# Patient Record
Sex: MALE | Race: WHITE | URBAN - METROPOLITAN AREA
[De-identification: names, ages, dates, MRNs, and addresses within clinical notes are randomized per-mention and may not be internally consistent; named-entity substitution may affect disease eponyms.]

---

## 2017-04-05 ENCOUNTER — HISTORICAL (OUTPATIENT)
Dept: ADMINISTRATIVE | Facility: HOSPITAL | Age: 82
End: 2017-04-05

## 2018-07-28 ENCOUNTER — HISTORICAL (OUTPATIENT)
Dept: ADMINISTRATIVE | Facility: HOSPITAL | Age: 83
End: 2018-07-28

## 2018-07-29 ENCOUNTER — HISTORICAL (OUTPATIENT)
Dept: ADMINISTRATIVE | Facility: HOSPITAL | Age: 83
End: 2018-07-29

## 2018-07-31 ENCOUNTER — HISTORICAL (OUTPATIENT)
Dept: ADMINISTRATIVE | Facility: HOSPITAL | Age: 83
End: 2018-07-31

## 2022-09-13 NOTE — H&P
Patient:   Min Luo             MRN: [id number]            FIN: [id number]               Age:   88 years     Sex:  Male     :  1929   Associated Diagnoses:   None   Author:   Jaswinder Hogue MD      Chief Complaint   2018 9:58 CDT       n/v/d and weakness started yesterday, last vomited 1500 yesterday, no diarrhea today, today weakness        History of Present Illness   CC: weakness  pt with hx of two weeks of decline. having chills and subjective fever.  developed nausea and sob over this period with poor appetite. noted also cough but non productive.  he developed vimiting and diarrhea yesterday but none since that time. He appears very weak and frail; He has had confusion over the last two weeks which has been worsening; He has a renal mass on CT that possibly could be a malignancy. He has risk factors of DVT and age. sympotms described as moderate and worsening; nothing relieving symptoms.       Review of Systems   Constitutional:  Fever, Chills, Sweats, Weakness, Decreased activity.    Eye:  No double vision, No visual disturbances.    Ear/Nose/Mouth/Throat:  No nasal congestion, No sore throat.    Respiratory:  Shortness of breath, Cough, Sputum production, Wheezing.    Cardiovascular:  No chest pain, No syncope.    Gastrointestinal:  Nausea, Vomiting, Diarrhea, No constipation, No heartburn, No abdominal pain.    Genitourinary:  No dysuria, No hematuria.    Hematology/Lymphatics:  No bruising tendency, No bleeding tendency.    Endocrine:  No excessive thirst, No polyuria.    Immunologic:  Not immunocompromised, No recurrent fevers.    Musculoskeletal:  Joint pain.    Integumentary:  No rash, No pruritus.    Neurologic:  Confusion, Headache.    Psychiatric:  No anxiety, No depression.    All other systems are negative      Health Status   Current medications:  (Selected)   Inpatient Medications  Ordered  Norco 5 mg-325 mg oral tablet: 1 tab(s), form: Tab, Oral, BID, first dose 18  21:00:00 CDT  Normal Saline (0.9% NS) IV 1,000 mL: 1,000 mL, 1,000 mL, IV, 100 mL/hr, start date 07/28/18 15:26:00 CDT  Rocephin (for IVPB): 1,000 mg, form: Infusion, IV Piggyback, q24hr, Infuse over: 30 minute(s), first dose 07/29/18 9:00:00 CDT  Xopenex 1.25 mg/3 mL inhalation solution: 1.25 mg, form: Soln-Inh, NEB, q6hr Resp, first dose 07/28/18 14:00:00 CDT  Zithromax 500 mg/D5W 250 mL IVPB: 500 mg, form: Injection, IV Piggyback, q24hr, Infuse over: 1 hr, first dose 07/29/18 9:27:00 CDT  multivitamin with minerals (Senior Tab): 1 tab(s), form: Tab, Oral, Daily, first dose 07/28/18 13:59:00 CDT  omeprazole 20 mg oral EC tablet (pt. own): 20 mg, form: Cap, Oral, Daily, first dose 07/28/18 15:30:00 CDT  ondansetron: 8 mg, form: Injection, IV Slow, q4hr PRN for nausea, first dose 07/28/18 19:20:00 CDT  warfarin 1 mg oral tablet: 2 mg, form: Tab, Oral, Tu, first dose 07/31/18 17:00:00 CDT, 1mg tab x 2 tabs on tuesday  warfarin: 1 mg, form: Tab, Oral, Jose,M,W,Th,F,Sa, first dose 07/28/18 17:00:00 CDT, 1 mg tab mon wed thur fri sat and sun  Prescriptions  Prescribed  Nasonex 50 mcg/inh nasal spray: 100 mcg = 2 spray(s), Nasal, Daily, # 3 EA, 3 Refill(s), [facility]: [facility]  [place] 5 mg-325 mg oral tablet: 1 tab(s), Oral, BID, # 60 tab(s), 0 Refill(s), Pharmacy: [facility]  mupirocin 2% topical oint: 1 rianna, TOP, TID, # 30 gm, 1 Refill(s), Pharmacy: [facility]  omeprazole 20 mg oral DR capsule: See Instructions, TAKE 1 CAPSULE DAILY, # 90 cap(s), 1 Refill(s), [facility]: [facility]  scooter: scooter, See Instructions, Impaired mobility due to osteoarthritis knee M17.9, # 1 EA, 0 Refill(s)  warfarin 1 mg oral tablet: See Instructions, TAKE 2 TABLETS ON SUNDAY, MONDAY, WEDNESDAY, THURSDAY AND FRIDAY NIGHT, THEN TAKE ONE AND ONE-HALF TABLETS ON TUESDAY AND SATURDAY, # 180 tab(s), eRx: [facility]  Documented  Medications  Documented  multivitamin with minerals (Senior Tab): 1 tab(s), Oral, Daily, daily, 0 Refill(s), Type: Maintenance      Histories   Past Medical History:    Resolved  Tubular adenoma ([id number]):  Resolved.  Peyronie's disease ([id number]):  Resolved.  Osteoarthritis of right knee joint (3545915068):  Resolved.  DVT - Deep vein thrombosis (9250687115):  Resolved.  Diverticulosis ([id number]):  Resolved.   Family History:    Primary malignant neoplasm of prostate  Brother  Atrial Fibrillation and Flutter  Brother  CVA (cerebral infarction).  Brother     Procedure history:    Inguinal herniorrhaphy (SNOMED CT 84947201).  Comments:  12/3/2015 12:32 - Janice Duggan MD  incarcerated  Colonic polypectomy (SNOMED CT 824259555).  Nasal polypectomy (SNOMED CT 857765485).  Partial colectomy (SNOMED CT 517392823).  IVC - Insertion of inferior vena caval filter (SNOMED CT 2748663759).   Social History        Social & Psychosocial Habits    Alcohol  09/07/2014 Risk Assessment: Denies Alcohol Use    06/04/2015  Use: Current    Type: Beer    Frequency: 1-2 times per month    Employment/School  06/04/2015  Status: Retired    Home/Environment  06/04/2015  Lives with: Spouse    Comment: Wife, Bell - 06/04/2015 08:54 - Janice Duggan MD    Tobacco  09/07/2014 Risk Assessment: Denies Tobacco Use    06/04/2015  Use: Never smoker  .        Physical Examination   General:  Alert and oriented, No acute distress.    Eye:  Pupils are equal, round and reactive to light, Extraocular movements are intact.    HENT:  Normocephalic.    Neck:  Supple, Non-tender, No carotid bruit.    Respiratory:  Breath sounds are equal, Symmetrical chest wall expansion, bilateral ronchi and wheezing; rales bilaterally worse on left.    Cardiovascular:  Normal rate, Regular rhythm, RSM sharp murmur noted diastolic.    Gastrointestinal:  Soft, Non-tender,  Non-distended.       Vital Signs (last 24 hrs)_____  Last Charted___________  Temp Oral     H 38.1DegC  (JUL 29 07:31)  Heart Rate Peripheral   98 bpm  (JUL 29 09:00)  Resp Rate         20 br/min  (JUL 29 09:00)  SBP      137 mmHg  (JUL 29 07:31)  DBP      62 mmHg  (JUL 29 07:31)  SpO2      95 %  (JUL 29 09:00)  Weight      66.5 kg  (JUL 29 05:00)     Genitourinary:  No costovertebral angle tenderness, No scrotal tenderness.    Lymphatics:  No lymphadenopathy neck, axilla, groin.    Musculoskeletal:  Normal range of motion, No tenderness, No swelling, right knee show no redness, warmth or swelling.    Integumentary:  Warm, Dry.    Neurologic:  Not alert, Not oriented.    Cognition and Speech:  appears confused.    Psychiatric:  Cooperative.       Review / Management   Results review:     Labs (Last four charted values)  WBC                  H 11.7 (JUL 29)   Hgb                  L 11.5 (JUL 29)   Hct                  L 34.4 (JUL 29)   Plt                  L 119 (JUL 29) .    Laboratory Results   Last 5 Days Lab Results : PowerNote Discrete Results   7/29/2018 5:23 CDT       WBC                       11.7 x10(3)/mcL  HI                             RBC                       3.50 x10(6)/mcL  LOW                             Hgb                       11.5 gm/dL  LOW                             Hct                       34.4 %  LOW                             Platelet                  119 x10(3)/mcL  LOW                             MCV                       98.5 fL  HI                             MCH                       32.8 pg  HI                             MCHC                      33.3 gm/dL                             RDW                       14.3 %                             MPV                       9.4 fL                             Abs Neut                  9.50 x10(3)/mcL  HI                             Neutro Auto               81 %  HI                             Lymph Auto                7 %  LOW                              Mono Auto                 12 %  HI                             Eos Auto                  0 %  NA                             Basophil Auto             0 %                             Abs Neutro                9.50 x10(3)/mcL  HI                             Abs Lymph                 0.8 x10(3)/mcL  NA                             Abs Mono                  1.4 x10(3)/mcL  NA    7/28/2018 13:03 CDT      POC Troponin              0.08 ng/mL    7/28/2018 11:00 CDT      PT                        23.4 second(s)  HI                             INR                       2.40                             PTT                       33.8 second(s)    7/28/2018 10:55 CDT      WBC                       9.2 x10(3)/mcL                             RBC                       4.06 x10(6)/mcL  LOW                             Hgb                       13.1 gm/dL  LOW                             Hct                       40.0 %  LOW                             Platelet                  147 x10(3)/mcL                             MCV                       98.4 fL  HI                             MCH                       32.2 pg  HI                             MCHC                      32.7 gm/dL  LOW                             RDW                       14.0 %                             MPV                       9.2 fL                             Abs Neut                  8.10 x10(3)/mcL                             Neutro Auto               88 %  HI                             Lymph Auto                4 %  LOW                             Mono Auto                 7 %                             Basophil Auto             0 %                             Abs Neutro                8.10 x10(3)/mcL                             Abs Lymph                 0.4 x10(3)/mcL  NA                             Abs Mono                  0.6 x10(3)/mcL  NA                             Sodium Lvl                135 mmol/L  LOW                              Potassium Lvl             3.5 mmol/L                             Chloride                  99 mmol/L                             CO2                       26.3 mmol/L                             Calcium Lvl               8.6 mg/dL                             Glucose Lvl               138 mg/dL  HI                             BUN                       19.9 mg/dL  HI                             Creatinine                1.39 mg/dL  HI                             eGFR-AA                   >60 mL/min/1.73 m2  NA                             eGFR-ANTONI                  51 mL/min/1.73 m2  NA                             Bili Total                0.8 mg/dL                             Bili Direct               0.20 mg/dL                             Bili Indirect             0.60 mg/dL                             AST                       34 unit/L                             ALT                       23 unit/L                             Alk Phos                  62 unit/L                             Total Protein             7.8 gm/dL                             Albumin Lvl               3.20 gm/dL  LOW                             Globulin                  4.60 gm/dL  HI                             A/G Ratio                 0.7 ratio  LOW    7/28/2018 10:54 CDT      POC Troponin              0.05 ng/mL    7/28/2018 10:33 CDT      UA Appear                 Clear                             UA Color                  Dark yellow                             UA Spec Grav              1.025                             UA Bili                   Negative                             UA pH                     6.5                             UA Urobilinogen           0.2                             UA Blood                  Moderate                             UA Glucose                Negative                             UA Ketones                15                             UA Protein                100                              UA Nitrite                Negative                             UA Leuk Est               Negative                             UA WBC                    None Seen                             UA RBC                    0-5 /HPF                             UA Bacteria               Rare                             UA Squam Epithelial       Rare        * Final Report *    Reason For Exam  Fever  no source;Other (please specify)    Radiology Report  Clinical History:  Fever     Technique:  Axial images of the chest, abdomen, and pelvis were obtained With  Contrast. Sagittal and coronal reconstructed images were available for  review.     Automatic exposure control was utilized to reduce the patient's  radiation dose.     Comparison  No prior images available for comparison.     Findings  AORTA: The thoracoabdominal aorta is normal in course and caliber.  Scattered atherosclerotic disease is noted.     HEART: Normal size. No pericardial effusion. Coronary atherosclerotic  disease is noted.     THYROID GLAND: The thyroid is not enlarged. There are no nodules  identified.     AIRWAYS: Trachea is midline and tracheobronchial tree is patent.     LUNGS: There are no masses or nodules identified. No pleural effusion  or pneumothorax. Bilateral lower lobe subsegmental atelectatic  changes.     THROACIC LYMPH NODES: There is no significant mediastinal, axillary or  hilar lymphadenopathy.     HEPATOBILIARY: No focal hepatic lesion is identified, status post  cholecystectomy.     SPLEEN: Normal     PANCREAS: No focal masses or ductal dilatation.     ADRENALS: No adrenal nodules.     KIDNEYS: The right kidney demonstrates no stone, hydronephrosis, or  hydroureter. No focal mass identified.The left kidney demonstrates no  stone, hydronephrosis, or hydroureter. 3.2 cm left upper pole soft  tissue appearing mass is noted.     ABDOMINAL LYMPHADENOPATHY/RETROPERITONEUM: There is no retroperitoneal  lymphadenopathy.     BOWEL:  Limited evaluation secondary to motion throughout. No acute  abnormality appreciated.     PELVIC VISCERA: Normal. No pelvic mass.     PELVIC LYMPH NODES: No lymphadenopathy.     PERITONEUM/ BODY WALL: No ascites or implant.     SKELETAL: No displaced fracture identified. Severe degenerative  changes of the lumbar spine with neural foraminal narrowing.     Impression  Study is limited significantly with motion artifact throughout.     No acute abnormality. Bilateral lower lobe subsegmental atelectatic  changes.     3.2 cm mass within the left upper pole of the left kidney. Malignancy  is not excluded. Further evaluation may be obtained with renal MRI.       Signature Line  Electronically Signed By: Aristides Langley MD  Date/Time Signed: 07/28/2018 13:20    Technical Comments  CT Abdomen and Pelvis W Contrast:  Did Patient have reaction? No   Consent Signed? Yes   Home Medication Reviewed? Yes     CT Angio Chest PE W Contrast:  Did Patient have reaction? No   Consent Signed? Yes   Home Medication Reviewed? Yes        This document has an image    Result type: CT Abdomen and Pelvis W Contrast  Result date: July 28, 2018 12:59 CDT  Result status: Auth (Verified)  Result title: CT Abdomen and Pelvis W Contrast  Performed by: Aristides Langley MD on July 28, 2018 13:02 CDT  Verified by: Aristides Langley MD on July 28, 2018 13:20 CDT  Encounter info: [id number], [facility] Acute, Emergency, 7/28/2018 - 7/28/2018     * Final Report *    Reason For Exam  hypoxemia h/o dvt;Other (please specify)    Radiology Report  Clinical History:  Fever     Technique:  Axial images of the chest, abdomen, and pelvis were obtained With  Contrast. Sagittal and coronal reconstructed images were available for  review.     Automatic exposure control was utilized to reduce the patient's  radiation dose.     Comparison  No prior images available for comparison.     Findings  AORTA: The thoracoabdominal aorta is normal in course and  caliber.  Scattered atherosclerotic disease is noted.     HEART: Normal size. No pericardial effusion. Coronary atherosclerotic  disease is noted.     THYROID GLAND: The thyroid is not enlarged. There are no nodules  identified.     AIRWAYS: Trachea is midline and tracheobronchial tree is patent.     LUNGS: There are no masses or nodules identified. No pleural effusion  or pneumothorax. Bilateral lower lobe subsegmental atelectatic  changes.     THROACIC LYMPH NODES: There is no significant mediastinal, axillary or  hilar lymphadenopathy.     HEPATOBILIARY: No focal hepatic lesion is identified, status post  cholecystectomy.     SPLEEN: Normal     PANCREAS: No focal masses or ductal dilatation.     ADRENALS: No adrenal nodules.     KIDNEYS: The right kidney demonstrates no stone, hydronephrosis, or  hydroureter. No focal mass identified.The left kidney demonstrates no  stone, hydronephrosis, or hydroureter. 3.2 cm left upper pole soft  tissue appearing mass is noted.     ABDOMINAL LYMPHADENOPATHY/RETROPERITONEUM: There is no retroperitoneal  lymphadenopathy.     BOWEL: Limited evaluation secondary to motion throughout. No acute  abnormality appreciated.     PELVIC VISCERA: Normal. No pelvic mass.     PELVIC LYMPH NODES: No lymphadenopathy.     PERITONEUM/ BODY WALL: No ascites or implant.     SKELETAL: No displaced fracture identified. Severe degenerative  changes of the lumbar spine with neural foraminal narrowing.     Impression  Study is limited significantly with motion artifact throughout.     No acute abnormality. Bilateral lower lobe subsegmental atelectatic  changes.     3.2 cm mass within the left upper pole of the left kidney. Malignancy  is not excluded. Further evaluation may be obtained with renal MRI.       Signature Line  Electronically Signed By: Aristides Langley MD  Date/Time Signed: 07/28/2018 13:20    Technical Comments  CT Abdomen and Pelvis W Contrast:  Did Patient have reaction? No    Consent Signed? Yes   Home Medication Reviewed? Yes     CT Angio Chest PE W Contrast:  Did Patient have reaction? No   Consent Signed? Yes   Home Medication Reviewed? Yes        This document has an image    Result type: CT Angio Chest PE W Contrast  Result date: July 28, 2018 12:57 CDT  Result status: Auth (Verified)  Result title: CT Angio Chest PE W Contrast  Performed by: Aristides Langley MD on July 28, 2018 13:02 CDT  Verified by: Aristides Langley MD on July 28, 2018 13:20 CDT  Encounter info: [id number], [facility] Acute, Emergency, 7/28/2018 - 7/28/2018           Impression and Plan   Fever cough and rales suggest bronchitis vs pneumonia: CXR pending this aM and I could not open viewer to review image. along with his sob and diarrha I am going to order legionella titers and add levaquin; await blood cultures;   There are no signs of septic joint.  fever could be coming from renal mass but all else would have to be excluded. He looks ill; His platelets dropped suggestive of sepsis/DIC; check fibrinogen; check lactic acid  Hx DVT: continue OAC; monitor INR;   Hx diverticulitis: no current finding to suggest diverticulitis;  pt family confirmed DNR status;   DVT Proph On OAC  wife is surrogate decision maker  Admit to acute care pt will need 3-5 days of therapy

## 2022-09-13 NOTE — CONSULTS
Patient:   Min Luo             MRN: [id number]            FIN: [id number]               Age:   88 years     Sex:  Male     :  1929   Associated Diagnoses:   None   Author:   Joe Hong MD      Chief Complaint   2018 9:58 CDT       n/v/d and weakness started yesterday, last vomited 1500 yesterday, no diarrhea today, today weakness        History of Present Illness   87 y/o man without significant past cardiac history,was admitted to the hospital for weakness.  Patient started to develop his weakness 2 weeks ago.  Yesterday he had nausea, emesis, and diarrhea.  Work up revelaed a renal mass.  Of note patient has a h/o diverticulitis with partial colectomy.  Cardiology was consulted for newonset afib.  Patient denies CP or SOB at rest.  He does endorse mild ESCALONA which has been present for the last few months.      Review of Systems   Eye:  Negative.    Ear/Nose/Mouth/Throat:  Negative.    Respiratory:  Shortness of breath.    Cardiovascular:  Negative.    Gastrointestinal:  Nausea, Vomiting, Diarrhea.    All other systems are negative      Health Status   Allergies:    Allergic Reactions (Selected)  No Known Allergies,    Allergies (1) Active Reaction  No Known Allergies None Documented     Current medications:  (Selected)   Inpatient Medications  Ordered  Norco 5 mg-325 mg oral tablet: 1 tab(s), form: Tab, Oral, BID, first dose 18 21:00:00 CDT  Normal Saline (0.9% NS) IV 1,000 mL: 1,000 mL, 1,000 mL, IV, 100 mL/hr, start date 18 15:26:00 CDT  Normal Saline (0.9% NS)  mL: 500 mL, 500 mL, IV, 999 mL/hr, start date 18 11:14:00 CDT  Xopenex 1.25 mg/3 mL inhalation solution: 1.25 mg, form: Soln-Inh, NEB, q6hr Resp, first dose 18 14:00:00 CDT  multivitamin with minerals (Senior Tab): 1 tab(s), form: Tab, Oral, Daily, first dose 18 13:59:00 CDT  omeprazole 20 mg oral EC tablet (pt. own): 20 mg, form: Cap, Oral, Daily, first dose 18 15:30:00  CDT  warfarin 1 mg oral tablet: 2 mg, form: Tab, Oral, Tu, first dose 07/31/18 17:00:00 CDT, 1mg tab x 2 tabs on tuesday  warfarin: 1 mg, form: Tab, Oral, Jose,M,W,Th,F,Sa, first dose 07/28/18 17:00:00 CDT, 1 mg tab mon wed thur fri sat and sun  Prescriptions  Prescribed  Nasonex 50 mcg/inh nasal spray: 100 mcg = 2 spray(s), Nasal, Daily, # 3 EA, 3 Refill(s), Pharmacy: [facility]  Esopus 5 mg-325 mg oral tablet: 1 tab(s), Oral, BID, # 60 tab(s), 0 Refill(s), Pharmacy: [facility]  mupirocin 2% topical oint: 1 rianna, TOP, TID, # 30 gm, 1 Refill(s), Pharmacy: [facility]  omeprazole 20 mg oral DR capsule: See Instructions, TAKE 1 CAPSULE DAILY, # 90 cap(s), 1 Refill(s), eRx: [facility]  scooter: scooter, See Instructions, Impaired mobility due to osteoarthritis knee M17.9, # 1 EA, 0 Refill(s)  warfarin 1 mg oral tablet: See Instructions, TAKE 2 TABLETS ON SUNDAY, MONDAY, WEDNESDAY, THURSDAY AND FRIDAY NIGHT, THEN TAKE ONE AND ONE-HALF TABLETS ON TUESDAY AND SATURDAY, # 180 tab(s), eRx: [facility]  Documented Medications  Documented  multivitamin with minerals (Senior Tab): 1 tab(s), Oral, Daily, daily, 0 Refill(s), Type: Maintenance   Problem list:    All Problems  High risk medication use(  Confirmed  ) / SNOMED CT 651987963 / Confirmed  Anticoagulant long-term use(  Confirmed  ) / SNOMED CT 311737512 / Confirmed  Anemia, macrocytic(  Confirmed  ) / SNOMED CT 303793716 / Confirmed  Combined hyperlipidemia(  Confirmed  ) / SNOMED CT 168816563 / Confirmed  Right knee DJD(  Confirmed  ) / SNOMED CT 438732449 / Confirmed,    Active Problems (5)  Anemia, macrocytic(  Confirmed  )   Anticoagulant long-term use(  Confirmed  )   Combined hyperlipidemia(  Confirmed  )   High risk medication use(  Confirmed  )   Right knee DJD(  Confirmed  )         Histories   Past Medical History:    Resolved  DVT - Deep vein  thrombosis (5015728958):  Resolved.  Tubular adenoma ([id number]):  Resolved.  Diverticulosis ([id number]):  Resolved.  Peyronie's disease ([id number]):  Resolved.   Family History:    Primary malignant neoplasm of prostate  Brother  Atrial Fibrillation and Flutter  Brother  CVA (cerebral infarction).  Brother     Procedure history:    Inguinal herniorrhaphy (63473462).  Comments:  12/3/2015 12:32 - Urban TONG, Janice A  incarcerated  Colonic polypectomy (403625598).  Nasal polypectomy (003823002).  Partial colectomy (950281278).  IVC - Insertion of inferior vena caval filter (3325234335).      Physical Examination   General:  Alert and oriented, No acute distress.    Eye:  Pupils are equal, round and reactive to light, Extraocular movements are intact.    HENT:  Normocephalic, Oral mucosa is moist.    Neck:  No jugular venous distention.    Respiratory:  Lungs are clear to auscultation.    Cardiovascular:  Irregularly irregular rhythm.         Systolic murmur: Intensity ( Grade I ).       Vital Signs (last 24 hrs)_____  Last Charted___________  Temp Oral     37.1 DegC  (JUL 28 14:36)  Heart Rate Peripheral   92 bpm  (JUL 28 15:00)  Resp Rate         18 br/min  (JUL 28 15:00)  SBP      119 mmHg  (JUL 28 14:36)  DBP      67 mmHg  (JUL 28 14:36)  SpO2      95 %  (JUL 28 15:00)     Measurements from flowsheet : Measurements   7/28/2018 14:36 CDT      Weight Dosing             66.9 kg                             Weight Measured and Calculated in Lbs     147.49 lb                             Weighing Method           Bed                             Height/Length Dosing      165.9 cm     Integumentary:  Dry, Pink.    Neurologic:  Alert, Oriented.    Psychiatric:  Cooperative, Appropriate mood & affect.       Health Maintenance      Health Maintenance     Pending (in the next year)        Due            Functional Assessment due  07/28/18  and  every 1  year(s)           Zoster Vaccine due  07/28/18  and every 100  year(s)        Due In Future            Obesity Screening not due until  05/02/19  and every 1  year(s)     Satisfied (in the past 1 year)        Satisfied            Blood Pressure Screening on  07/28/18.  Satisfied by Janice Varela RN           Body Mass Index Check on  05/02/18.  Satisfied by Doretha Lozano LPN           Depression Screening on  03/28/18.  Satisfied by Estefany Nuñez LPN           Diabetes Screening on  07/28/18.  Satisfied by Cali Hardin           Fall Risk Assessment on  07/28/18.  Satisfied by Janice Varela RN           Influenza Vaccine on  10/04/17.  Satisfied by aSrah Lutz           Lipid Screening on  08/09/17.  Satisfied by Amy Mishra           Obesity Screening on  05/02/18.  Satisfied by Doretha Lozano LPN          Review / Management   Results review:     No qualifying data available.       Impression and Plan   1. AFIB-new onset, rate controlled on EKG---HR 92, negative troponin  2. Weakness  3. Fever  4.Abnormal CT  5. H/O DVT with IVC filter placement  6.SOB    Plan  TSH,FT4  with next lab draw---to evaluate for thyroid disorder  Lopressor 25 mg po BID, titrate as needed  Echo to evaluate EF  Continue Coumadin with Goal INR 2-3 for AFIB,  Already taking for presumed DVT.  Follow up with  or Dr. James in [place].  Please try to arrange a follow up appointment for his wife also. She no longer is able to drive to [place] to see her cardiologist and is requesting follow up in [place].  Thanks for the consult, please reconsult as needed.

## 2022-09-13 NOTE — H&P
Patient:   Min Luo             MRN: [id number]            FIN: [id number]               Age:   88 years     Sex:  Male     :  1929   Associated Diagnoses:   None   Author:   Jaswinder Hogue MD      Chief Complaint      History of Present Illness   CC: weakness  pt with hx of two weeks of decline. having chills and subjective fever.  developed nausea and sob over this period with poor appetite. noted also cough but non productive.  he developed vimiting and diarrhea yesterday but none since that time. He appears very weak and frail; He has had confusion over the last two weeks which has been worsening; He has a renal mass on CT that possibly could be a malignancy. He has risk factors of DVT and age. sympotms described as moderate and worsening; nothing relieving symptoms.       Review of Systems   Constitutional:  Fever, Chills, Sweats, Weakness, Decreased activity.    Eye:  No double vision, No visual disturbances.    Ear/Nose/Mouth/Throat:  No nasal congestion, No sore throat.    Respiratory:  Shortness of breath, Cough, Sputum production, Wheezing.    Cardiovascular:  No chest pain, No syncope.    Gastrointestinal:  Nausea, Vomiting, Diarrhea, No constipation, No heartburn, No abdominal pain.    Genitourinary:  No dysuria, No hematuria.    Hematology/Lymphatics:  No bruising tendency, No bleeding tendency.    Endocrine:  No excessive thirst, No polyuria.    Immunologic:  Not immunocompromised, No recurrent fevers.    Musculoskeletal:  Joint pain.    Integumentary:  No rash, No pruritus.    Neurologic:  Confusion, Headache.    Psychiatric:  No anxiety, No depression.    All other systems are negative             Health Status   Current medications:  (Selected)   Inpatient Medications  Ordered  Ceftriaxone 2 gm/D5W 50 mL (Premix): 2 gm, form: Infusion, IV Piggyback, q24hr, Infuse over: 0.5 hr, first dose 18 9:30:00 CDT  Coumadin: 1.5 mg, form: Tab, Oral, Jose,M,W,Th,F,Sa, first dose 18  17:55:00 CDT, NOW, 1.5mg Sun, Mon, Wed, Th, Fri, Sat  Coumadin: 2 mg, form: Tab, Oral, Tu, first dose 07/31/18 17:00:00 CDT, 2mg dose on Tuesday only  Norco 5 mg-325 mg oral tablet: 1 tab(s), form: Tab, Oral, BID PRN for pain, moderate, first dose 07/29/18 10:03:00 CDT  Xopenex 0.63 mg/3 mL inhalation solution: 0.63 mg, form: Soln-Inh, NEB, q4hr Resp, first dose 07/29/18 11:00:00 CDT, do not substitute  cefTRIAXone: 1 gm, form: Infusion, IV Piggyback, q24hr, Infuse over: 0.5 hr, Order duration: 1 dose(s), first dose 07/29/18 10:05:00 CDT, stop date 07/30/18 10:04:00 CDT, NOW, total 2 grams  levofloxacin IVPB ( Levaquin ): 750 mg, form: Infusion, IV Piggyback, q24hr, Infuse over: 1.5 hr, first dose 07/29/18 11:04:00 CDT  ondansetron: 8 mg, form: Injection, IV Slow, q4hr PRN for nausea, first dose 07/29/18 10:04:00 CDT  Documented Medications  Documented  Norco 5 mg-325 mg oral tablet: 1 tab(s), Oral, BID, 0 Refill(s)  Xopenex 1.25 mg/3 mL inhalation solution: 1.25 mg = 3 mL, NEB, q6hr Resp, 0 Refill(s)  cefTRIAXone 2 g intravenous injection: IV Piggyback, q24hr, 0 Refill(s)  multivitamin with minerals (Senior Tab): 1 tab(s), Oral, Daily, 0 Refill(s)  ondansetron 2 mg/mL injectable solution: 8 mg = 4 mL, IV Slow, q4hr, PRN PRN nausea, 0 Refill(s)  warfarin 1 mg oral tablet: 1 mg = 1 tab(s), Oral, Jose,M,W,Th,F,Sa, 0 Refill(s)  warfarin 1 mg oral tablet: 2 mg = 2 tab(s), Oral, Tu, 0 Refill(s)      Histories   Past Medical History:    Resolved  Tubular adenoma ([id number]):  Resolved.  Peyronie's disease ([id number]):  Resolved.  Osteoarthritis of right knee joint (5125976549):  Resolved.  DVT - Deep vein thrombosis (8331690106):  Resolved.  Diverticulosis ([id number]):  Resolved.   Family History:    Primary malignant neoplasm of prostate  Brother  Atrial Fibrillation and Flutter  Brother  CVA (cerebral infarction).  Brother     Procedure  history:    Inguinal herniorrhaphy (SNOMED CT 26789597).  Comments:  12/3/2015 12:32 - Janice Duggan MD  incarcerated  Colonic polypectomy (SNOMED CT 354330999).  Nasal polypectomy (SNOMED CT 757959222).  Partial colectomy (SNOMED CT 231543128).  IVC - Insertion of inferior vena caval filter (SNOMED CT 1992526944).   Social History        Social & Psychosocial Habits    Alcohol  09/07/2014 Risk Assessment: Denies Alcohol Use    06/04/2015  Use: Current    Type: Beer    Frequency: 1-2 times per month    Employment/School  06/04/2015  Status: Retired    Home/Environment  06/04/2015  Lives with: Spouse    Comment: Wife, Bell - 06/04/2015 08:54 - Janice Duggan MD    Tobacco  09/07/2014 Risk Assessment: Denies Tobacco Use    06/04/2015  Use: Never smoker  .        Physical Examination   General:  Alert and oriented, No acute distress.    Eye:  Pupils are equal, round and reactive to light, Extraocular movements are intact.    HENT:  Normocephalic.    Neck:  Supple, Non-tender, No carotid bruit.    Respiratory:  Breath sounds are equal, Symmetrical chest wall expansion, bilateral ronchi and wheezing; rales bilaterally worse on left.    Cardiovascular:  Normal rate, Regular rhythm, RSM sharp murmur noted diastolic.    Gastrointestinal:  Soft, Non-tender, Non-distended.       Vital Signs (last 24 hrs)_____  Last Charted___________  Temp Oral     36.9 DegC  (JUL 30 04:15)  Heart Rate Peripheral   H 106bpm  (JUL 30 07:04)  Resp Rate         H 25br/min  (JUL 30 07:00)  SBP      131 mmHg  (JUL 30 07:04)  DBP      79 mmHg  (JUL 30 07:04)  SpO2      96 %  (JUL 30 07:04)  Weight      69.5 kg  (JUL 30 05:00)     Genitourinary:  No costovertebral angle tenderness, No scrotal tenderness.    Lymphatics:  No lymphadenopathy neck, axilla, groin.    Musculoskeletal:  Normal range of motion, No tenderness, No swelling, right knee show no redness, warmth or swelling.    Integumentary:  Warm, Dry.    Neurologic:  Not alert, Not  oriented.    Cognition and Speech:  appears confused.    Psychiatric:  Cooperative.       Review / Management   Results review:     Labs (Last four charted values)  WBC                  9.8 (JUL 30)   Hgb                  L 11.8 (JUL 30)   Hct                  L 35.0 (JUL 30)   Plt                  L 113 (JUL 30)   Na                   137 (JUL 30)   K                    L 3.4 (JUL 30)   CO2                  27.8 (JUL 30)   Cl                   99 (JUL 30)   Cr                   1.26 (JUL 30)   BUN                  17.4 (JUL 30)   Glucose Random       91 (JUL 30)   PT                   H 20.9 (JUL 30)   INR                  2.12 (JUL 30) .       Impression and Plan   Fever cough and rales suggest bronchitis vs pneumonia: CXR pending this aM and I could not open viewer to review image. along with his sob and diarrha I am going to order legionella titers and add levaquin; await blood cultures;   There are no signs of septic joint.  fever could be coming from renal mass but all else would have to be excluded. He looks ill; His platelets dropped suggestive of sepsis/DIC; check fibrinogen; check lactic acid  Hx DVT: continue OAC; monitor INR;   Hx diverticulitis: no current finding to suggest diverticulitis;  pt family confirmed DNR status;   DVT Proph On OAC  wife is surrogate decision maker  Admit to acute care pt will need 3-5 days of therapy

## 2022-09-13 NOTE — DISCHARGE SUMMARY
Patient:   Min Luo             MRN: [id number]            FIN: [id number]               Age:   88 years     Sex:  Male     :  1929   Associated Diagnoses:   None   Author:   Lennie TNOG, Jaswinder      Results Review   SEE H&P FOR DC SUMMARY FROM OBSERVATION TO ACUTE INPATIENT STATUS      Physical Examination      Vital Signs (last 24 hrs)_____  Last Charted___________  Temp Oral     H 38.1DegC  (:)  Heart Rate Peripheral   98 bpm  (:)  Resp Rate         20 br/min  (:)  SBP      137 mmHg  (:31)  DBP      62 mmHg  (:)  SpO2      95 %  (:)  Weight      66.5 kg  (:00)

## 2022-09-13 NOTE — ED PROVIDER NOTES
Patient:   Min Luo             MRN: [id number]            FIN: [id number]               Age:   88 years     Sex:  Male     :  1929   Associated Diagnoses:   Weakness; Fever; Atrial fibrillation; Kidney mass   Author:   Douglas Hatfield MD      Basic Information   Time seen: Date & time 2018 10:25:00.   History source: Patient, EMS.   Arrival mode: Private vehicle.   History limitation: None.   Additional information: Chief Complaint from Nursing Triage Note : Chief Complaint   2018 9:58 CDT       Chief Complaint           n/v/d and weakness started yesterday, last vomited 1500 yesterday, no diarrhea today, today weakness  .      History of Present Illness   This 88-year-old man is brought in with complaints of nausea vomiting diarrhea and weakness which started yesterday.  This morning he had urinary incontinence and was too weak to get out of bed.  His wife states when he she tried to help him up and complained of back pain.  She reports that he has been feeling poorly for the last 2 weeks with lack of energy.       Review of Systems   Constitutional symptoms:  Fever.   Respiratory symptoms:  Shortness of breath.   Gastrointestinal symptoms:  Nausea, vomiting, diarrhea.    Genitourinary symptoms:  Urinary incontinence.   Musculoskeletal symptoms:  Back pain.             Additional review of systems information: All other systems reviewed and otherwise negative.      Health Status   Allergies:    Allergic Reactions (Selected)  No Known Allergies.   Medications:  (Selected)   Prescriptions  Prescribed  Nasonex 50 mcg/inh nasal spray: 100 mcg = 2 spray(s), Nasal, Daily, # 3 EA, 3 Refill(s), Pharmacy: [facility]  [place] 5 mg-325 mg oral tablet: 1 tab(s), Oral, BID, # 60 tab(s), 0 Refill(s), Pharmacy: [facility]  mupirocin 2% topical oint: 1 rianna, TOP, TID, # 30 gm, 1 Refill(s), Pharmacy: [facility]  omeprazole 20 mg oral   capsule: See Instructions, TAKE 1 CAPSULE DAILY, # 90 cap(s), 1 Refill(s), eRx: [facility]  scooter: scooter, See Instructions, Impaired mobility due to osteoarthritis knee M17.9, # 1 EA, 0 Refill(s)  warfarin 1 mg oral tablet: See Instructions, TAKE 2 TABLETS ON SUNDAY, MONDAY, WEDNESDAY, THURSDAY AND FRIDAY NIGHT, THEN TAKE ONE AND ONE-HALF TABLETS ON TUESDAY AND SATURDAY, # 180 tab(s), eRx: [facility]  Documented Medications  Documented  multivitamin with minerals (Senior Tab): 1 tab(s), Oral, Daily, daily, 0 Refill(s), Type: Maintenance, per nurse's notes.      Past Medical/ Family/ Social History   Medical history:    Resolved  DVT - Deep vein thrombosis (1195465888):  Resolved.  Tubular adenoma ([id number]):  Resolved.  Diverticulosis ([id number]):  Resolved.  Peyronie's disease ([id number]):  Resolved., Reviewed as documented in chart.   Surgical history:    Inguinal herniorrhaphy ([id number]).  Comments:  12/3/2015 12:32 - Janice Duggan MD  incarcerated  Colonic polypectomy ([id number]).  Nasal polypectomy (455124033).  Partial colectomy (170516644).  IVC - Insertion of inferior vena caval filter ([id number])., Reviewed as documented in chart.   Family history:    Primary malignant neoplasm of prostate  Brother  Atrial Fibrillation and Flutter  Brother  CVA (cerebral infarction).  Brother  , Reviewed as documented in chart.   Social history:    Social & Psychosocial Habits    Alcohol  09/07/2014 Risk Assessment: Denies Alcohol Use    06/04/2015  Use: Current    Type: Beer    Frequency: 1-2 times per month    Employment/School  06/04/2015  Status: Retired    Home/Environment  06/04/2015  Lives with: Spouse    Comment: Wife, Bell - 06/04/2015 08:54 - Janice Duggan MD    Tobacco  09/07/2014 Risk Assessment: Denies Tobacco Use    06/04/2015  Use: Never smoker  , Alcohol use: Occasionally,  Tobacco use: Denies, Drug use: Denies, Occupation: Retired, Family/social situation: .   Problem list:    Active Problems (5)  Anemia, macrocytic(  Confirmed  )   Anticoagulant long-term use(  Confirmed  )   Combined hyperlipidemia(  Confirmed  )   High risk medication use(  Confirmed  )   Right knee DJD(  Confirmed  )   , per nurse's notes.      Physical Examination               Vital Signs   Vital Signs   7/28/2018 10:15 CDT      Peripheral Pulse Rate     98 bpm                             Heart Rate Monitored      101 bpm  HI                             Respiratory Rate          38 br/min  HI                             SpO2                      98 %                             Oxygen Therapy            Nasal cannula                             Oxygen Flow Rate          2 L/min                             Systolic Blood Pressure   156 mmHg  HI                             Diastolic Blood Pressure  81 mmHg                             Mean Arterial Pressure, Cuff              106 mmHg    7/28/2018 10:03 CDT      SpO2                      96 %                             Oxygen Therapy            Nasal cannula  (Modified)                            Oxygen Flow Rate          2 L/min    7/28/2018 9:58 CDT       Temperature Oral          38.3 DegC  HI                             Temperature Oral (calculated)             100.94 DegF                             Peripheral Pulse Rate     102 bpm  HI                             Respiratory Rate          20 br/min                             SpO2                      92 %  LOW                             Oxygen Therapy            Room air                             Systolic Blood Pressure   155 mmHg  HI                             Diastolic Blood Pressure  72 mmHg  .   Basic Oxygen Information   7/28/2018 10:15 CDT      SpO2                      98 %                             Oxygen Therapy            Nasal cannula                             Oxygen Flow  Rate          2 L/min    7/28/2018 10:03 CDT      SpO2                      96 %                             Oxygen Therapy            Nasal cannula  (Modified)                            Oxygen Flow Rate          2 L/min    7/28/2018 9:58 CDT       SpO2                      92 %  LOW                             Oxygen Therapy            Room air  .   General:  Alert, mild distress.    Skin:  Warm, dry, intact.    Head:  Normocephalic, atraumatic.    Neck:  Supple, trachea midline, no tenderness, no JVD, no carotid bruit.    Eye:  Pupils are equal, round and reactive to light, extraocular movements are intact.    Ears, nose, mouth and throat:  Tympanic membranes clear, oral mucosa moist, no pharyngeal erythema or exudate.    Cardiovascular:  Regular rate and rhythm, No murmur, Normal peripheral perfusion, No edema, no Tachycardia, no Bradycardia.    Respiratory:  Lungs are clear to auscultation, respirations are non-labored, breath sounds are equal.    Gastrointestinal:  Soft, Non distended, Normal bowel sounds, No organomegaly, Tenderness: Negative, Guarding: Negative, Rebound: Negative.    Genitourinary   Back:  Nontender, Normal range of motion, Normal alignment.    Musculoskeletal:  Normal ROM, normal strength, no tenderness, no swelling, no deformity.    Neurological:  Alert and oriented to person, place, time, and situation, No focal neurological deficit observed, CN II-XII intact, normal sensory observed, normal motor observed, normal speech observed, normal coordination observed.    Lymphatics:  No lymphadenopathy.   Psychiatric:  Cooperative, appropriate mood & affect, normal judgment.       Medical Decision Making   Differential Diagnosis:  Nausea, vomiting, diarrhea, dehydration, electrolyte abnormality.    Orders  Launch Orders   Laboratory:  Blood Culture (Order): 7/28/2018 10:31 CDT, Stat collect, Blood  Blood Culture (Order): 7/28/2018 10:31 CDT, Stat collect, Blood  POC iSTAT ER Troponin request  [facility]  (Order): Blood, Stat collect, 7/28/2018 10:30 CDT by Douglas Hatfield MD, Nurse collect  Urinalysis Complete a reflex to culture (Order): Stat collect, Urine, 7/28/2018 10:30 CDT, Nurse collect, Print Label By Order Location  CMP (Order): Stat collect, 7/28/2018 10:30 CDT, Blood, Lab Collect, 7/28/2018 10:30 CDT  CBC w/ Auto Diff (Order): Now collect, 7/28/2018 10:29 CDT, Blood, Lab Collect, 7/28/2018 10:29 CDT  Patient Care:  Orthostatic Vital Signs (Order): 7/28/2018 10:30 CDT  Radiology:  XR Chest 2 Views (Order): Stat, 7/28/2018 10:30 CDT, Cough, None, Stretcher, Rad Type, Not Scheduled  Cardiology:  EKG (Order): 7/28/2018 10:30 CDT, -1, -1, 7/28/2018 10:30 CDT, Launch Orders   Radiology:  CT Abdomen and Pelvis W Contrast (Order): Stat, 7/28/2018 12:05 CDT, Other (please specify), Fever  no source, None, Stretcher, Rad Type  CT Angio Chest PE W Contrast (Order): Stat, 7/28/2018 12:04 CDT, Other (please specify), hypoxemia h/o dvt, None, Stretcher, Rad Type, Schedule this test, [facility] ED.    Radiology results:  Rad Results (ST)  < 12 hrs   Accession: [id number]  Order: XR Chest 1 View  Report Dt/Tm: 07/28/2018 11:32  Report:      CLINICAL: Cough.     COMPARISON: None available.                       FINDINGS: Cardiopericardial silhouette is within normal limits. Left  basilar minimal linear atelectasis. No dense focal or segmental  consolidation, congestive process, pleural effusions or pneumothorax.   Old fracture of the left humeral shaft.     IMPRESSION:     Left basilar minimal atelectasis.    .   Notes:  CT chest , abdomen and pelvis :    Impression  Study is limited significantly with motion artifact throughout.     No acute abnormality. Bilateral lower lobe subsegmental atelectatic  changes.     3.2 cm mass within the left upper pole of the left kidney. Malignancy  is not excluded. Further evaluation may be obtained with renal MRI.       Signature Line  Electronically Signed By: Korin TONG, Aristides  Omer  Date/Time Signed: 07/28/2018 13:20  .      Reexamination/ Reevaluation   Ready to admit spoke to DR Hogue and requests CT chest and abdomen.      Impression and Plan   Diagnosis   Weakness (OXH98-RR R53.1)   Fever (WVV38-YU R50.9)   Atrial fibrillation (VEF62-LF I48.91)   Kidney mass (PRV30-NT N28.89)   Plan   Disposition: Admit time  7/28/2018 13:51:00, Place in Observation Unit, Lennie TONG, Jaswinder.    Counseled: Patient, Family, Regarding diagnosis, Regarding diagnostic results, Regarding treatment plan, Patient indicated understanding of instructions.